# Patient Record
Sex: MALE | Race: WHITE | NOT HISPANIC OR LATINO
[De-identification: names, ages, dates, MRNs, and addresses within clinical notes are randomized per-mention and may not be internally consistent; named-entity substitution may affect disease eponyms.]

---

## 2021-01-05 PROBLEM — Z00.00 ENCOUNTER FOR PREVENTIVE HEALTH EXAMINATION: Status: ACTIVE | Noted: 2021-01-05

## 2021-01-11 ENCOUNTER — NON-APPOINTMENT (OUTPATIENT)
Age: 31
End: 2021-01-11

## 2021-01-14 ENCOUNTER — APPOINTMENT (OUTPATIENT)
Dept: UROLOGY | Facility: CLINIC | Age: 31
End: 2021-01-14
Payer: MEDICAID

## 2021-01-14 VITALS — HEART RATE: 83 BPM | DIASTOLIC BLOOD PRESSURE: 71 MMHG | SYSTOLIC BLOOD PRESSURE: 112 MMHG | TEMPERATURE: 98.2 F

## 2021-01-14 PROCEDURE — 99204 OFFICE O/P NEW MOD 45 MIN: CPT

## 2021-01-14 PROCEDURE — 99072 ADDL SUPL MATRL&STAF TM PHE: CPT

## 2021-01-14 NOTE — PHYSICAL EXAM
[Urethral Meatus] : meatus normal [Penis Abnormality] : normal circumcised penis [Urinary Bladder Findings] : the bladder was normal on palpation [Scrotum] : the scrotum was normal [Epididymis] : the epididymides were normal [Testes Tenderness] : no tenderness of the testes [Testes Mass (___cm)] : there were no testicular masses [FreeTextEntry1] : 10-12cc bialteral testes. grade II bialteral varicocele

## 2021-01-14 NOTE — HISTORY OF PRESENT ILLNESS
[FreeTextEntry1] : 30M  to Adriana Cordero (30F) comes in with failure to concive over the last 2-3 years. 2 children conceived naturally ages 7 and 4. Initial pregnancy after 3 years of trying. negative female factor evalatuion. no ED. no ejaculatoyr dysfucntion. good libido. feels "tightness around there". felt symptoms of "ripping around there"\par \par FSH 1.6\par LH 3.7\par Semen analysis:\par 3.3 ml/2 mil/ml/50% motile/0% normal

## 2021-01-14 NOTE — ASSESSMENT
[FreeTextEntry1] : bilateral varicocele\par difficult exam\par hypogonadism\par oligospermia\par failure to conceive with multiple delays in pregnancy\par reviewed role of varicocele in maangement of oligospermia\par scrtoal utlrasound\par f/u 2 weeks

## 2021-01-28 ENCOUNTER — APPOINTMENT (OUTPATIENT)
Dept: UROLOGY | Facility: CLINIC | Age: 31
End: 2021-01-28
Payer: MEDICAID

## 2021-01-28 VITALS
TEMPERATURE: 97.9 F | BODY MASS INDEX: 23.7 KG/M2 | HEART RATE: 62 BPM | WEIGHT: 151 LBS | DIASTOLIC BLOOD PRESSURE: 68 MMHG | SYSTOLIC BLOOD PRESSURE: 102 MMHG | HEIGHT: 67 IN

## 2021-01-28 PROCEDURE — 99072 ADDL SUPL MATRL&STAF TM PHE: CPT

## 2021-01-28 PROCEDURE — 99213 OFFICE O/P EST LOW 20 MIN: CPT

## 2021-01-28 NOTE — HISTORY OF PRESENT ILLNESS
[FreeTextEntry1] : sono at [facility] \par left 2.8mm varicocele\par no right varicocele\par on repeat exam today left grade I varicocele\par repeat SA azospermia\par wife pregnant but miscarried

## 2021-01-28 NOTE — ASSESSMENT
[FreeTextEntry1] : left grade I varicocele\par +miscarriage\par repeat SA\par repeat scrotal sonogram - exam and ultrasound findings are discordant

## 2021-02-03 ENCOUNTER — OUTPATIENT (OUTPATIENT)
Dept: OUTPATIENT SERVICES | Facility: HOSPITAL | Age: 31
LOS: 1 days | End: 2021-02-03
Payer: COMMERCIAL

## 2021-02-03 ENCOUNTER — APPOINTMENT (OUTPATIENT)
Dept: ULTRASOUND IMAGING | Facility: HOSPITAL | Age: 31
End: 2021-02-03
Payer: MEDICAID

## 2021-02-03 PROCEDURE — 93975 VASCULAR STUDY: CPT

## 2021-02-03 PROCEDURE — 76870 US EXAM SCROTUM: CPT | Mod: 26

## 2021-02-03 PROCEDURE — 76870 US EXAM SCROTUM: CPT

## 2021-02-03 PROCEDURE — 93975 VASCULAR STUDY: CPT | Mod: 26

## 2021-02-11 ENCOUNTER — APPOINTMENT (OUTPATIENT)
Dept: UROLOGY | Facility: CLINIC | Age: 31
End: 2021-02-11
Payer: MEDICAID

## 2021-02-11 VITALS — HEART RATE: 79 BPM | TEMPERATURE: 98.6 F | SYSTOLIC BLOOD PRESSURE: 125 MMHG | DIASTOLIC BLOOD PRESSURE: 74 MMHG

## 2021-02-11 PROCEDURE — 99214 OFFICE O/P EST MOD 30 MIN: CPT | Mod: 57

## 2021-02-11 PROCEDURE — 99072 ADDL SUPL MATRL&STAF TM PHE: CPT

## 2021-02-11 NOTE — HISTORY OF PRESENT ILLNESS
[FreeTextEntry1] : returns for follow up\par repeat sonogram with 4mm bilateral varicocele\par here to discuss

## 2021-02-11 NOTE — ASSESSMENT
[FreeTextEntry1] : bilateral varicocele\par We had an in depth conversation regarding the benefit of varicocelectomy today. He understands the literature which overwhelming reports a benefit in improvement in semen parameters. He also understands that there is limited data in terms of spontaneous pregnancy and take home baby rates.There is good data suggesting improved semen parameters which can possibly obviate the need for future IVF and has been shown to improve IVF outcomes in selected patients. We discussed the recent results of a [place] review which reported that one natural pregnancy is achieved for every 3-5 varicocele repairs over an unknown time frame. We also spoke about the fact that there is a 3 to 6 month delay before improvement is seen in semen parameters. The role of advancing maternal age was discussed in depth.\par \par Surgical risks, including a risk of infection, injury to the testicular artery (extremely rare), injury to the vas deferens and hydrocele were discussed, as was post operative pain and pain control. Post operative skin numbness in the anterior thigh/lateral scrotum were also discussed.\par opts to proceed\par will schedule

## 2021-02-12 LAB
ANION GAP SERPL CALC-SCNC: 14 MMOL/L
APPEARANCE: CLEAR
APTT BLD: 32.5 SEC
BACTERIA UR CULT: NORMAL
BACTERIA: NEGATIVE
BASOPHILS # BLD AUTO: 0.03 K/UL
BASOPHILS NFR BLD AUTO: 0.6 %
BILIRUBIN URINE: NEGATIVE
BLOOD URINE: NEGATIVE
BUN SERPL-MCNC: 13 MG/DL
CALCIUM SERPL-MCNC: 9.5 MG/DL
CHLORIDE SERPL-SCNC: 103 MMOL/L
CO2 SERPL-SCNC: 25 MMOL/L
COLOR: NORMAL
CREAT SERPL-MCNC: 0.83 MG/DL
EOSINOPHIL # BLD AUTO: 0.09 K/UL
EOSINOPHIL NFR BLD AUTO: 1.7 %
GLUCOSE QUALITATIVE U: NEGATIVE
GLUCOSE SERPL-MCNC: 82 MG/DL
HCT VFR BLD CALC: 47.2 %
HGB BLD-MCNC: 15.6 G/DL
HYALINE CASTS: 0 /LPF
IMM GRANULOCYTES NFR BLD AUTO: 0.4 %
INR PPP: 0.95 RATIO
KETONES URINE: NEGATIVE
LEUKOCYTE ESTERASE URINE: NEGATIVE
LYMPHOCYTES # BLD AUTO: 1.58 K/UL
LYMPHOCYTES NFR BLD AUTO: 29.1 %
MAN DIFF?: NORMAL
MCHC RBC-ENTMCNC: 29.1 PG
MCHC RBC-ENTMCNC: 33.1 GM/DL
MCV RBC AUTO: 88.1 FL
MICROSCOPIC-UA: NORMAL
MONOCYTES # BLD AUTO: 0.44 K/UL
MONOCYTES NFR BLD AUTO: 8.1 %
NEUTROPHILS # BLD AUTO: 3.27 K/UL
NEUTROPHILS NFR BLD AUTO: 60.1 %
NITRITE URINE: NEGATIVE
PH URINE: 7
PLATELET # BLD AUTO: 211 K/UL
POTASSIUM SERPL-SCNC: 4 MMOL/L
PROTEIN URINE: NEGATIVE
PT BLD: 11.3 SEC
RBC # BLD: 5.36 M/UL
RBC # FLD: 12.6 %
RED BLOOD CELLS URINE: 1 /HPF
SODIUM SERPL-SCNC: 142 MMOL/L
SPECIFIC GRAVITY URINE: 1.02
SQUAMOUS EPITHELIAL CELLS: 0 /HPF
UROBILINOGEN URINE: NORMAL
WBC # FLD AUTO: 5.43 K/UL
WHITE BLOOD CELLS URINE: 0 /HPF

## 2021-02-17 ENCOUNTER — TRANSCRIPTION ENCOUNTER (OUTPATIENT)
Age: 31
End: 2021-02-17

## 2021-02-21 ENCOUNTER — TRANSCRIPTION ENCOUNTER (OUTPATIENT)
Age: 31
End: 2021-02-21

## 2021-02-22 ENCOUNTER — OUTPATIENT (OUTPATIENT)
Dept: OUTPATIENT SERVICES | Facility: HOSPITAL | Age: 31
LOS: 1 days | Discharge: HOME | End: 2021-02-22
Payer: MEDICAID

## 2021-02-22 ENCOUNTER — APPOINTMENT (OUTPATIENT)
Dept: UROLOGY | Facility: AMBULATORY SURGERY CENTER | Age: 31
End: 2021-02-22

## 2021-02-22 LAB — SARS-COV-2 N GENE NPH QL NAA+PROBE: NOT DETECTED

## 2021-02-22 PROCEDURE — 55535 REVISE SPERMATIC CORD VEINS: CPT | Mod: 50

## 2021-02-22 RX ORDER — DOCUSATE SODIUM 100 MG
1 CAPSULE ORAL
Qty: 8 | Refills: 0
Start: 2021-02-22

## 2021-02-28 ENCOUNTER — NON-APPOINTMENT (OUTPATIENT)
Age: 31
End: 2021-02-28

## 2021-03-01 ENCOUNTER — NON-APPOINTMENT (OUTPATIENT)
Age: 31
End: 2021-03-01

## 2021-03-09 ENCOUNTER — APPOINTMENT (OUTPATIENT)
Dept: UROLOGY | Facility: CLINIC | Age: 31
End: 2021-03-09
Payer: MEDICAID

## 2021-03-09 VITALS — SYSTOLIC BLOOD PRESSURE: 122 MMHG | TEMPERATURE: 98.3 F | DIASTOLIC BLOOD PRESSURE: 70 MMHG | HEART RATE: 72 BPM

## 2021-03-09 PROCEDURE — 99024 POSTOP FOLLOW-UP VISIT: CPT

## 2021-03-22 ENCOUNTER — NON-APPOINTMENT (OUTPATIENT)
Age: 31
End: 2021-03-22

## 2021-03-22 DIAGNOSIS — I86.1 SCROTAL VARICES: ICD-10-CM

## 2021-03-22 RX ORDER — CEPHALEXIN 500 MG/1
500 CAPSULE ORAL
Qty: 14 | Refills: 0 | Status: ACTIVE | COMMUNITY
Start: 2021-03-22 | End: 1900-01-01

## 2021-03-23 ENCOUNTER — APPOINTMENT (OUTPATIENT)
Dept: UROLOGY | Facility: CLINIC | Age: 31
End: 2021-03-23
Payer: MEDICAID

## 2021-03-23 VITALS — TEMPERATURE: 98.1 F | DIASTOLIC BLOOD PRESSURE: 75 MMHG | SYSTOLIC BLOOD PRESSURE: 115 MMHG | HEART RATE: 66 BPM

## 2021-03-23 PROCEDURE — 99024 POSTOP FOLLOW-UP VISIT: CPT

## 2021-03-23 NOTE — HISTORY OF PRESENT ILLNESS
[FreeTextEntry1] : 3 weeks postop\par complains of green discharge form right sided incision\par no fevers chills

## 2021-05-12 ENCOUNTER — NON-APPOINTMENT (OUTPATIENT)
Age: 31
End: 2021-05-12

## 2023-10-19 ENCOUNTER — APPOINTMENT (OUTPATIENT)
Dept: UROLOGY | Facility: CLINIC | Age: 33
End: 2023-10-19
Payer: SELF-PAY

## 2023-10-19 VITALS
WEIGHT: 151 LBS | HEART RATE: 79 BPM | DIASTOLIC BLOOD PRESSURE: 78 MMHG | BODY MASS INDEX: 23.7 KG/M2 | SYSTOLIC BLOOD PRESSURE: 137 MMHG | HEIGHT: 67 IN | TEMPERATURE: 98.6 F

## 2023-10-19 DIAGNOSIS — K40.90 UNILATERAL INGUINAL HERNIA, W/OUT OBSTRUCTION OR GANGRENE, NOT SPECIFIED AS RECURRENT: ICD-10-CM

## 2023-10-19 PROCEDURE — 99213 OFFICE O/P EST LOW 20 MIN: CPT

## 2023-11-03 ENCOUNTER — NON-APPOINTMENT (OUTPATIENT)
Age: 33
End: 2023-11-03

## 2023-12-11 ENCOUNTER — NON-APPOINTMENT (OUTPATIENT)
Age: 33
End: 2023-12-11